# Patient Record
(demographics unavailable — no encounter records)

---

## 2024-11-26 NOTE — ASSESSMENT
[FreeTextEntry1] :  YURI is a 12 year old boy now s/p single incision laparoscopic appendectomy for acute appendicitis with Dr. Anaya on 11/1   who presents today for a routine post op check. He has recovered well from surgery.  He is tolerating a regular diet, having regular bowel movements and is back to his usual activities. On exam the incision is well healed. We reviewed the pathology which confirms the presence of appendicitis.  A copy of the report was provided to the family.  The patient was reminded to let all HCP know that they had surgery and no longer has an appendix. He is cleared to resume full physical activities.   Follow up with the pediatrician as usual and with pediatric surgery should any new or concerning symptoms arise. All questions answered.

## 2024-11-26 NOTE — CONSULT LETTER
[Dear  ___] : Dear  [unfilled], [Courtesy Letter:] : I had the pleasure of seeing your patient, [unfilled], in my office today. [Please see my note below.] : Please see my note below. [Consult Closing:] : Thank you very much for allowing me to participate in the care of this patient.  If you have any questions, please do not hesitate to contact me. [Sincerely,] : Sincerely, [FreeTextEntry2] : Dr. Avina [FreeTextEntry3] : Eleonora Bradford PA-C Supervisor, [facility] Department of General Pediatric Surgery & Trauma [place], [place] [address]

## 2024-11-26 NOTE — REASON FOR VISIT
[____ Week(s)] : [unfilled] week(s)  [Laparoscopic appendectomy, acute] : acute laparoscopic appendectomy [Normal bowel movements] : ~He/She~ has normal bowel movements [Tolerating Diet] : ~He/She~ is tolerating diet [Patient] : patient [Mother] : mother [Pain] : ~He/She~ does not have pain [Fever] : ~He/She~ does not have fever [Vomiting] : ~He/She~ does not have vomiting [Redness at incision] : ~He/She~ does not have redness at incision [Drainage at incision] : ~He/She~ does not have drainage at incision [Swelling at surgical site] : ~He/She~ does not have swelling at surgical site [de-identified] : 11/1/24 [de-identified] : Dr. Anaya